# Patient Record
Sex: MALE | Race: WHITE | NOT HISPANIC OR LATINO | Employment: FULL TIME
[De-identification: names, ages, dates, MRNs, and addresses within clinical notes are randomized per-mention and may not be internally consistent; named-entity substitution may affect disease eponyms.]

---

## 2024-03-08 ENCOUNTER — TRANSCRIBE ORDERS (OUTPATIENT)
Dept: OTHER | Age: 39
End: 2024-03-08

## 2024-03-08 DIAGNOSIS — H71.90 CHOLESTEATOMA, UNSPECIFIED LATERALITY: Primary | ICD-10-CM

## 2024-03-11 NOTE — TELEPHONE ENCOUNTER
FUTURE VISIT INFORMATION      FUTURE VISIT INFORMATION:  Date: 6/4/24  Time: 8:40 AM  Location: [facility]  REFERRAL INFORMATION:  Referring provider:  MOY CORONEL MD  Referring providers clinic:  [facility]  Reason for visit/diagnosis:  Cholesteatoma, unspecified laterality [H71.90]  REF BY MOY CORONEL affiliated with [facility]  RECS IN EPIC  CONF LOC  SCHED W/PT     RECORDS REQUESTED FROM      Clinic name Comments Records Status Imaging Status   [facility]   2/6/24 referral/ OV with Moy Coronel MD   CE    [facility]  Fax: [phone] 2/27/24 CT temporal CE Req 4/30/24  PACS           April 30, 2024 7:40 AM - Request for images pushed to [place] from [facility]  May 1, 2024 9:58 AM - Images received and resolved in PACS. Patient is having audiogram done prior to visit with Dr Garcia. Patient was referred to [facility] by Dr Coronel but was told he does not need to -[facility]

## 2024-05-29 DIAGNOSIS — Z01.10 ENCOUNTER FOR HEARING TEST: Primary | ICD-10-CM

## 2024-05-30 NOTE — PROGRESS NOTES
"  Neurotology Clinic      Name: Nikolay Fowler  MRN: [id number]  Age: 38 year old  : 1985  Referring provider: Moy Salomon  2024     Chief Complaint:   Consultation    History of Present Illness:   Nikolay Fowler is a 38 year old male with a history of bilateral tinnitus (R>L) and ear pain, who presents for consultation regarding cholesteatoma.  Consultation was requested by Dr. Moy Salomon.     He initially noticed  high pitched ringing in the right ear over the past several months. It was initially quite bothersome but is now more manageable. His notes that his hearing on the right is muffled as well. He is not having issues with balance. He presented to his PCP and was diagnosed with an ear infection and had two courses of antibiotics. He was then referred to ENT and Dr. Salomon identified a cholesteatoma. He was referred for further management.     He is cleaning his ear with qtips. He had infrequent ear infections as a child. No history of ear surgeries or any other surgeries. He has noticed taste change in the past year.      Review of Systems:   Pertinent items are noted in HPI or as in patient entered ROS below, remainder of complete ROS is negative.       2024    10:11 AM    ENT ROS   Ears, Nose, Throat Hearing loss    Ringing/noise in ears        Active Medications:   Ibuprofen prn for aches or pains     Allergies:   None     Past Medical History:  None     Past Surgical History:  None    Family History:   No family history on file.      Social History:   He works as a  for programming the machines  He smokes cigarettes about 0.5 PPD   No other recreational substances    Physical Exam:   BP (!) 159/88   Pulse 64   Temp 97.5  F (36.4  C)   Ht 1.753 m (5' 9\")   Wt 81.6 kg (180 lb)   SpO2 98%   BMI 26.58 kg/m         Constitutional:  The patient was unaccompanied, well-groomed, and in no acute distress.     Skin: Normal:  warm and pink without rash   Neurologic: " Alert and oriented x 3.  CN's III-XII within normal limits.  Voice normal.    Psychiatric: The patient's affect was calm, cooperative, and appropriate.     Communication:  Normal; communicates verbally, normal voice quality.   Respiratory: Breathing comfortably without stridor or exertion of accessory muscles.    Head/Face:  No lesions or scars. No sinus tenderness.     Eyes: Pupils were equal and reactive.  Extraocular movement intact.     Ears: Pinnae and tragus non-tender.        Otologic microscope exam:  Right ear: EAC is clear. TM with posterior superior white mass under the tympanic membrane. The pars flaccida is covered with wax which was partially removed revealing a retraction pocket.   Left ear: EAC with minimal cerumen which was cleaned with a curette. The TM is intact in neutral position and the middle ear is aerated.      Audiogram:  AUDIOGRAM: He underwent an audiogram today. This demonstrated:      Right: Speech reception threshold is 30 dB with 100% word recognition. Tympanogram A type   Left: Speech reception threshold is 20 dB with 100% word recognition. Tympanogram A type     Audiogram was independently reviewed    Imaging:  CT TEMPORAL W/O CONTRAST (02/27/24)  INDICATION: Cholesteatoma right ear. Hearing loss.     FINDINGS:   Right ear:   External auditory canal is patent. There is a small mass (4 mm) involving the tympanic membrane associated with the long process of the incus and malleus and anterior aspect of the sinus tympani. The epitympanum and Prussak`s space are clear. Mastoid antrum clear. Single opacified right mastoid air cell. The other mastoid air cells are clear. The cochlea, vestibule and semicircular canals are unremarkable.     Left ear:   The external auditory canal normally patent. The middle ear cavity is clear the ossicular chain appears normal the scutum is sharp. The mastoid air cells are well developed and clear. The cochlear vestibule are unremarkable. There is thinning  of the covering of the superior semicircular canal consistent with possible dehiscence.     Wider field-of-view images shows opacification of the right maxillary   sinus which is relatively small/atelectatic.     Impression  Right ear: Small mass involving the right tympanic membrane long process of the malleus and incus with subtle erosion of the scutum consistent with the clinical impression of cholesteatoma. See above.  Left ear: Possible dehiscence of the left superior semicircular canal.  Opacified atelectatic right maxillary sinus.    Outside records from [facility] were independently reviewed.       Assessment and Plan:  Nikolay Fowler is a 38 year old male being seen in consultation regarding a right cholesteatoma.     I was able to show the patient the findings on the monitor today and discussed the pathophysiology of a cholesteatoma. He understands that the growth will continue to erode the bone in the ear and that there is no medical therapy available for this.  The cholesteatoma has likely eroded the ossicles and that is why he is unable to hear well out of this ear.  We discussed the risks of untreated cholesteatoma in detail and he stated that he understands them. He indicated that he is motivated to have a treatment performed on the right ear.     The procedure recommended is a right tympanomastoidectomy. I described the procedure in detail and the implications.  We will plan to reconstruct the tympanic membrane with a cartilage graft. I described that he will require a second stage procedure and that I will not be reconstructing hearing at the time of the first surgery.  Due to the need to remove ossicles involved in disease, we discussed that we expect his hearing to worsen with surgery. This may or may be able to be improved with ossicular reconstruction.    I went through all the steps of the procedure.  I discussed the common and serious risks in detail  including tympanic membrane perforation, taste change, failure to achieve desired hearing improvement, complete and irreversible profound sensorineural hearing loss or variable degrees of sensorineural hearing loss or loss of word understanding, worsening of tinnitus, development of disequilibrium, vertigo or imbalance that could be temporary or permanent, facial nerve paralysis, CSF leak, need for additional procedures, and others as discussed.  Postoperative restrictions were discussed.     He indicated that he would like to proceed. Thank you very much for the opportunity to participate in the care of your patient.     Follow-up: No follow-ups on file.       Dolly Henriquez MD  Fellow Physician  Otology & Neurotology  [facility]      Vidhi Garcia MD  Otology & Neurotology  [facility]    I, Vidhi Garcia MD, saw this patient with the resident/fellow and agree with the resident s findings and plan of care as documented in the resident s/fellow s note. I was present for the entire procedure.

## 2024-05-31 ENCOUNTER — OFFICE VISIT (OUTPATIENT)
Dept: AUDIOLOGY | Facility: CLINIC | Age: 39
End: 2024-05-31
Payer: COMMERCIAL

## 2024-05-31 ENCOUNTER — OFFICE VISIT (OUTPATIENT)
Dept: OTOLARYNGOLOGY | Facility: CLINIC | Age: 39
End: 2024-05-31
Payer: COMMERCIAL

## 2024-05-31 ENCOUNTER — PREP FOR PROCEDURE (OUTPATIENT)
Dept: OTOLARYNGOLOGY | Facility: CLINIC | Age: 39
End: 2024-05-31

## 2024-05-31 VITALS
DIASTOLIC BLOOD PRESSURE: 88 MMHG | HEART RATE: 64 BPM | WEIGHT: 180 LBS | OXYGEN SATURATION: 98 % | TEMPERATURE: 97.5 F | HEIGHT: 69 IN | BODY MASS INDEX: 26.66 KG/M2 | SYSTOLIC BLOOD PRESSURE: 159 MMHG

## 2024-05-31 DIAGNOSIS — H71.91 CHOLESTEATOMA OF RIGHT EAR: Primary | ICD-10-CM

## 2024-05-31 DIAGNOSIS — H71.91 CHOLESTEATOMA, RIGHT: Primary | ICD-10-CM

## 2024-05-31 DIAGNOSIS — H90.11 CONDUCTIVE HEARING LOSS OF RIGHT EAR, UNSPECIFIED HEARING STATUS ON CONTRALATERAL SIDE: ICD-10-CM

## 2024-05-31 DIAGNOSIS — H90.11 CONDUCTIVE HEARING LOSS IN RIGHT EAR: Primary | ICD-10-CM

## 2024-05-31 PROCEDURE — 92557 COMPREHENSIVE HEARING TEST: CPT | Performed by: AUDIOLOGIST

## 2024-05-31 PROCEDURE — 92565 STENGER TEST PURE TONE: CPT | Performed by: AUDIOLOGIST

## 2024-05-31 PROCEDURE — 99244 OFF/OP CNSLTJ NEW/EST MOD 40: CPT | Mod: 25 | Performed by: OTOLARYNGOLOGY

## 2024-05-31 PROCEDURE — 92550 TYMPANOMETRY & REFLEX THRESH: CPT | Performed by: AUDIOLOGIST

## 2024-05-31 PROCEDURE — 92504 EAR MICROSCOPY EXAMINATION: CPT | Mod: GC | Performed by: OTOLARYNGOLOGY

## 2024-05-31 RX ORDER — DEXAMETHASONE SODIUM PHOSPHATE 4 MG/ML
10 INJECTION, SOLUTION INTRA-ARTICULAR; INTRALESIONAL; INTRAMUSCULAR; INTRAVENOUS; SOFT TISSUE ONCE
Status: CANCELLED | OUTPATIENT
Start: 2024-05-31 | End: 2024-05-31

## 2024-05-31 RX ORDER — CEFUROXIME SODIUM 1.5 G/16ML
1.5 INJECTION, POWDER, FOR SOLUTION INTRAVENOUS
Status: CANCELLED | OUTPATIENT
Start: 2024-05-31

## 2024-05-31 RX ORDER — CEFUROXIME SODIUM 1.5 G/16ML
1.5 INJECTION, POWDER, FOR SOLUTION INTRAVENOUS SEE ADMIN INSTRUCTIONS
Status: CANCELLED | OUTPATIENT
Start: 2024-05-31

## 2024-05-31 ASSESSMENT — PAIN SCALES - GENERAL: PAINLEVEL: NO PAIN (0)

## 2024-05-31 NOTE — PATIENT INSTRUCTIONS
You were seen in the ENT Clinic today by Dr. Garcia. If you have any questions or concerns after your appointment, please contact us (see below)      2.   Please return to clinic for your post-op appointments.     Surgery Teaching Surgery Teaching      1.You must have a physical exam (called  history and physical ) within 30 days of surgery. You can do this at the PAC clinic or your family clinic. Bring the Pre-Op Surgery Form (found in the surgery packet that you received in the mail) with you to your primary doctor if you chose to have them complete this. If your doctor is in the [facility], they do not need this form.     2. Ask your doctor what medicines are safe before surgery.          * If you are on any blood-thinners, we will need to make a plan with your INR clinic or prescribing doctor of when you need to stop these medications before surgery    3. NO MOTRIN, IBUPROFEN, ASPIRIN, ALEVE, GARLIC SUPPLEMENTS or FISH OIL x 7 days prior to surgery ( to prevent excess bleeding and bruising at time of surgery).    4. For same-day surgery, you must arrange for an adult to take you home from the Center. An adult must stay with you for the first 24 hours after surgery. You cannot drive for 24 hours, or longer if you are still taking narcotic medications.      5. Stop drinking alcohol at least 24 hours before surgery.      6. Stop or at least cut down on smoking 24 hours before surgery.     7. Take a bath or shower the night before and the morning of surgery (refer to surgery packet for extra information). You should use the soap that we mailed to you or gave in clinic (2 small bottles). Use the entire bottle of soap for each shower, washing from the neck down, then rinsing off. Sleep in clean clothing and use clean bedding sheets. If your doctor does not give you special soap, buy Hibiclens or Kerrie-Stat at the drug store or ask the pharmacist to suggest a brand. Do not put on lotion, powder, perfume,  deodorant or make-up after bathing.     8. You can eat a normal meal the night before surgery. Do not eat any solid foods or drink any milk products for 8 hours before surgery. A pre-op nurse will call you the week before surgery to confirm your eating cut-off time, but please listen to this 8-hour rule if you miss their call.     9. You may drink clear liquids until 2 hours before surgery. Clear liquids include water, Gatorade, apple juice, or other liquids you can read through.    10.  If you need FMLA paperwork filled out for your surgery, please send this to us before surgery if you are able (in-person, fax, or mail). DO NOT give this to the pre-op nurses on the day of surgery or it will get lost.    11. Generally, we recommend 1 week off of work for healing after surgery. If you have a labor-intensive job with heavy lifting, you may need a work-modification and we are able to give you a work letter for this so that you can continue to work.    How to Contact Us:  Send a [employer]) message to your provider. Our team will respond to you via [employer] ([employer]). Occasionally, we will need to call you to get further information.  For urgent matters (Monday-Friday), call the ENT Clinic: [phone] and speak with a call center team member - they will route your call appropriately.   If you'd like to speak directly with a nurse, please find our contact information below. We do our best to check voicemail frequently throughout the day, and will work to call you back within 1-2 days. For urgent matters, please use the general clinic phone numbers listed above.      Karla DICKENS RN  ENT RN Care Coordinator  Direct: [phone]    Anastasia HAMILTON LPN  Direct: [phone]

## 2024-05-31 NOTE — NURSING NOTE
"Chief Complaint   Patient presents with    Consult     Cholesteatoma      Blood pressure (!) 159/88, pulse 64, temperature 97.5  F (36.4  C), height 1.753 m (5' 9\"), weight 81.6 kg (180 lb), SpO2 98%.  Christian Camargo LPN    "

## 2024-05-31 NOTE — PROGRESS NOTES
AUDIOLOGY REPORT    SUMMARY: Audiology visit completed. See audiogram for results.      RECOMMENDATIONS: Follow-up with ENT.    oJse Campos, [facility]  Licensed Audiologist  [place] License #[id number]

## 2024-05-31 NOTE — LETTER
2024       RE: Nikolay Fowler  [address] [address]     Dear Colleague,    Thank you for referring your patient, Nikolay Fowler, to the [facility] [place] [facility]. Please see a copy of my visit note below.      Neurotology Clinic      Name: Nikolay Fowler  MRN: [id number]  Age: 38 year old  : 1985  Referring provider: Moy Salomon  2024     Chief Complaint:   Consultation    History of Present Illness:   Nikolay Fowler is a 38 year old male with a history of bilateral tinnitus (R>L) and ear pain, who presents for consultation regarding cholesteatoma.  Consultation was requested by Dr. Moy Salomon.     He initially noticed  high pitched ringing in the right ear over the past several months. It was initially quite bothersome but is now more manageable. His notes that his hearing on the right is muffled as well. He is not having issues with balance. He presented to his PCP and was diagnosed with an ear infection and had two courses of antibiotics. He was then referred to ENT and Dr. Salomon identified a cholesteatoma. He was referred for further management.     He is cleaning his ear with qtips. He had infrequent ear infections as a child. No history of ear surgeries or any other surgeries. He has noticed taste change in the past year.      Review of Systems:   Pertinent items are noted in HPI or as in patient entered ROS below, remainder of complete ROS is negative.       2024    10:11 AM    ENT ROS   Ears, Nose, Throat Hearing loss    Ringing/noise in ears        Active Medications:   Ibuprofen prn for aches or pains     Allergies:   None     Past Medical History:  None     Past Surgical History:  None    Family History:   No family history on file.      Social History:   He works as a  for programming the machines  He smokes cigarettes about 0.5 PPD   No other  "recreational substances    Physical Exam:   BP (!) 159/88   Pulse 64   Temp 97.5  F (36.4  C)   Ht 1.753 m (5' 9\")   Wt 81.6 kg (180 lb)   SpO2 98%   BMI 26.58 kg/m         Constitutional:  The patient was unaccompanied, well-groomed, and in no acute distress.     Skin: Normal:  warm and pink without rash   Neurologic: Alert and oriented x 3.  CN's III-XII within normal limits.  Voice normal.    Psychiatric: The patient's affect was calm, cooperative, and appropriate.     Communication:  Normal; communicates verbally, normal voice quality.   Respiratory: Breathing comfortably without stridor or exertion of accessory muscles.    Head/Face:  No lesions or scars. No sinus tenderness.     Eyes: Pupils were equal and reactive.  Extraocular movement intact.     Ears: Pinnae and tragus non-tender.        Otologic microscope exam:  Right ear: EAC is clear. TM with posterior superior white mass under the tympanic membrane. The pars flaccida is covered with wax which was partially removed revealing a retraction pocket.   Left ear: EAC with minimal cerumen which was cleaned with a curette. The TM is intact in neutral position and the middle ear is aerated.      Audiogram:  AUDIOGRAM: He underwent an audiogram today. This demonstrated:      Right: Speech reception threshold is 30 dB with 100% word recognition. Tympanogram A type   Left: Speech reception threshold is 20 dB with 100% word recognition. Tympanogram A type     Audiogram was independently reviewed    Imaging:  CT TEMPORAL W/O CONTRAST (02/27/24)  INDICATION: Cholesteatoma right ear. Hearing loss.     FINDINGS:   Right ear:   External auditory canal is patent. There is a small mass (4 mm) involving the tympanic membrane associated with the long process of the incus and malleus and anterior aspect of the sinus tympani. The epitympanum and Prussak`s space are clear. Mastoid antrum clear. Single opacified right mastoid air cell. The other mastoid air cells are " clear. The cochlea, vestibule and semicircular canals are unremarkable.     Left ear:   The external auditory canal normally patent. The middle ear cavity is clear the ossicular chain appears normal the scutum is sharp. The mastoid air cells are well developed and clear. The cochlear vestibule are unremarkable. There is thinning of the covering of the superior semicircular canal consistent with possible dehiscence.     Wider field-of-view images shows opacification of the right maxillary   sinus which is relatively small/atelectatic.     Impression  Right ear: Small mass involving the right tympanic membrane long process of the malleus and incus with subtle erosion of the scutum consistent with the clinical impression of cholesteatoma. See above.  Left ear: Possible dehiscence of the left superior semicircular canal.  Opacified atelectatic right maxillary sinus.    Outside records from [facility] were independently reviewed.       Assessment and Plan:  Nikolay Fowler is a 38 year old male being seen in consultation regarding a right cholesteatoma.     I was able to show the patient the findings on the monitor today and discussed the pathophysiology of a cholesteatoma. He understands that the growth will continue to erode the bone in the ear and that there is no medical therapy available for this.  The cholesteatoma has likely eroded the ossicles and that is why he is unable to hear well out of this ear.  We discussed the risks of untreated cholesteatoma in detail and he stated that he understands them. He indicated that he is motivated to have a treatment performed on the right ear.     The procedure recommended is a right tympanomastoidectomy. I described the procedure in detail and the implications.  We will plan to reconstruct the tympanic membrane with a cartilage graft. I described that he will require a second stage procedure and that I will not be reconstructing hearing  at the time of the first surgery.  Due to the need to remove ossicles involved in disease, we discussed that we expect his hearing to worsen with surgery. This may or may be able to be improved with ossicular reconstruction.    I went through all the steps of the procedure.  I discussed the common and serious risks in detail including tympanic membrane perforation, taste change, failure to achieve desired hearing improvement, complete and irreversible profound sensorineural hearing loss or variable degrees of sensorineural hearing loss or loss of word understanding, worsening of tinnitus, development of disequilibrium, vertigo or imbalance that could be temporary or permanent, facial nerve paralysis, CSF leak, need for additional procedures, and others as discussed.  Postoperative restrictions were discussed.     He indicated that he would like to proceed. Thank you very much for the opportunity to participate in the care of your patient.     Follow-up: No follow-ups on file.       Dolly Henriquez MD  Fellow Physician  Otology & Neurotology  [facility]      Vidhi Garcia MD  Otology & Neurotology  [facility]    I, Vidhi Garcia MD, saw this patient with the resident/fellow and agree with the resident s findings and plan of care as documented in the resident s/fellow s note. I was present for the entire procedure.      Again, thank you for allowing me to participate in the care of your patient.      Sincerely,    Vidhi Garcia MD

## 2024-06-03 ENCOUNTER — TELEPHONE (OUTPATIENT)
Dept: OTOLARYNGOLOGY | Facility: CLINIC | Age: 39
End: 2024-06-03
Payer: COMMERCIAL

## 2024-06-03 NOTE — TELEPHONE ENCOUNTER
Called patient to schedule surgery with Dr. Garcia. Voicemail left for patient, mid voicemail patient picked up but writer could not hear back. Unable to reach patient     Rhiannon Cuba on 6/3/2024 at 3:26 PM  Phone: [phone]

## 2024-06-04 ENCOUNTER — PRE VISIT (OUTPATIENT)
Dept: OTOLARYNGOLOGY | Facility: CLINIC | Age: 39
End: 2024-06-04

## 2024-06-04 PROBLEM — H71.91 CHOLESTEATOMA, RIGHT: Status: ACTIVE | Noted: 2024-05-31

## 2024-06-04 NOTE — TELEPHONE ENCOUNTER
Scheduled surgery with Dr. Garcia on 9/4/2024 - patient declined [birth year] as his [birth year] is a few days later    Spoke with: Patient    Surgery is located at [facility]    Patient does not have a PCP but is planning on going to [facility] in [facility] for their H&P within 30 days of surgery    Does patient need a consult before upcoming surgery? No: already done    Anesthesia type: General    Requested Imaging required for surgery: No    Patient is scheduled for their 3 week post op on 9/24/2024 at 1050am with Dr. Garcia    Patient will receive their surgery packet & surgical soap via mail per their preference - Confirmed address on file is up to date    Patient was not provided a start time for surgery & is aware they will receive this information 3-5 days before surgery    Additional comments: Patient was instructed to call back with any further questions or concerns.     Joseline Naranjo on 6/4/2024 at 8:14 AM

## 2024-09-03 ENCOUNTER — ANESTHESIA EVENT (OUTPATIENT)
Dept: SURGERY | Facility: AMBULATORY SURGERY CENTER | Age: 39
End: 2024-09-03
Payer: COMMERCIAL

## 2024-09-03 NOTE — ANESTHESIA PREPROCEDURE EVALUATION
"Anesthesia Pre-Procedure Evaluation    Patient: Nikolay Fowler   MRN: [id number] : 1985        Procedure : Procedure(s):  RIGHT CANAL WALL UP TYMPANOMASTIODECTOMY WITH CARTILAGE GRAFT          No past medical history on file.   No past surgical history on file.   No Known Allergies   Social History     Tobacco Use     Smoking status: Every Day     Current packs/day: 0.50     Types: Cigarettes     Smokeless tobacco: Never     Tobacco comments:     Has smoked cigarettes for 22 years    Substance Use Topics     Alcohol use: Not on file      Wt Readings from Last 1 Encounters:   24 81.6 kg (180 lb)        Anesthesia Evaluation   Pt has not had prior anesthetic         ROS/MED HX  ENT/Pulmonary: Comment: Right ear colesteatoma    (+)     DESTINEY risk factors, snores loudly,          tobacco use (0.5), Current use,   patient smoked within 24 hours,                    Neurologic:  - neg neurologic ROS     Cardiovascular:  - neg cardiovascular ROS     METS/Exercise Tolerance: 3 - Able to walk 1-2 blocks without stopping    Hematologic:  - neg hematologic  ROS     Musculoskeletal:  - neg musculoskeletal ROS     GI/Hepatic:  - neg GI/hepatic ROS     Renal/Genitourinary:  - neg Renal ROS     Endo:  - neg endo ROS     Psychiatric/Substance Use:  - neg psychiatric ROS     Infectious Disease:  - neg infectious disease ROS     Malignancy:  - neg malignancy ROS     Other:          Physical Exam    Airway        Mallampati: III   TM distance: > 3 FB   Neck ROM: full   Mouth opening: > 3 cm    Respiratory Devices and Support         Dental       (+) Multiple visibly decayed, broken teeth      Cardiovascular   cardiovascular exam normal       Rhythm and rate: regular and normal     Pulmonary           breath sounds clear to auscultation       OUTSIDE LABS:  CBC: No results found for: \"WBC\", \"HGB\", \"HCT\", \"PLT\"  BMP: No results found for: \"NA\", \"POTASSIUM\", \"CHLORIDE\", \"CO2\", \"BUN\", \"CR\", \"GLC\"  COAGS: No results found " "for: \"PTT\", \"INR\", \"FIBR\"  POC: No results found for: \"BGM\", \"HCG\", \"HCGS\"  HEPATIC: No results found for: \"ALBUMIN\", \"PROTTOTAL\", \"ALT\", \"AST\", \"GGT\", \"ALKPHOS\", \"BILITOTAL\", \"BILIDIRECT\", \"RAYNE\"  OTHER: No results found for: \"PH\", \"LACT\", \"A1C\", \"PEYTON\", \"PHOS\", \"MAG\", \"LIPASE\", \"AMYLASE\", \"TSH\", \"T4\", \"T3\", \"CRP\", \"SED\"    Anesthesia Plan    ASA Status:  2    NPO Status:  NPO Appropriate    Anesthesia Type: General.     - Airway: ETT   Induction: Intravenous, Propofol.   Maintenance: TIVA.   Techniques and Equipment:     - Airway: Video-Laryngoscope       Consents    Anesthesia Plan(s) and associated risks, benefits, and realistic alternatives discussed. Questions answered and patient/representative(s) expressed understanding.     - Discussed:     - Discussed with:  Patient            Postoperative Care    Pain management: Multi-modal analgesia.   PONV prophylaxis: Ondansetron (or other 5HT-3), Dexamethasone or Solumedrol, Background Propofol Infusion     Comments:    Other Comments: Duoneb pre-op         Elvia Wright MD    I have reviewed the pertinent notes and labs in the chart from the past 30 days and (re)examined the patient.  Any updates or changes from those notes are reflected in this note.                  "

## 2024-09-04 ENCOUNTER — HOSPITAL ENCOUNTER (OUTPATIENT)
Facility: AMBULATORY SURGERY CENTER | Age: 39
Discharge: HOME | End: 2024-09-04
Attending: OTOLARYNGOLOGY | Admitting: OTOLARYNGOLOGY
Payer: COMMERCIAL

## 2024-09-04 ENCOUNTER — ANESTHESIA (OUTPATIENT)
Dept: SURGERY | Facility: AMBULATORY SURGERY CENTER | Age: 39
End: 2024-09-04
Payer: COMMERCIAL

## 2024-09-04 VITALS
BODY MASS INDEX: 26.66 KG/M2 | HEIGHT: 69 IN | WEIGHT: 180 LBS | TEMPERATURE: 98 F | DIASTOLIC BLOOD PRESSURE: 96 MMHG | HEART RATE: 94 BPM | SYSTOLIC BLOOD PRESSURE: 143 MMHG | RESPIRATION RATE: 20 BRPM | OXYGEN SATURATION: 94 %

## 2024-09-04 DIAGNOSIS — H71.91 CHOLESTEATOMA, RIGHT: Primary | ICD-10-CM

## 2024-09-04 PROCEDURE — 21235 EAR CARTILAGE GRAFT: CPT | Mod: 59 | Performed by: OTOLARYNGOLOGY

## 2024-09-04 PROCEDURE — 69631 REPAIR EARDRUM STRUCTURES: CPT | Mod: RT | Performed by: OTOLARYNGOLOGY

## 2024-09-04 PROCEDURE — 69631 REPAIR EARDRUM STRUCTURES: CPT | Mod: RT

## 2024-09-04 PROCEDURE — 69631 REPAIR EARDRUM STRUCTURES: CPT | Performed by: ANESTHESIOLOGY

## 2024-09-04 PROCEDURE — 21235 EAR CARTILAGE GRAFT: CPT | Mod: 59

## 2024-09-04 PROCEDURE — 88304 TISSUE EXAM BY PATHOLOGIST: CPT | Mod: TC | Performed by: OTOLARYNGOLOGY

## 2024-09-04 PROCEDURE — 88304 TISSUE EXAM BY PATHOLOGIST: CPT | Mod: 26 | Performed by: STUDENT IN AN ORGANIZED HEALTH CARE EDUCATION/TRAINING PROGRAM

## 2024-09-04 PROCEDURE — 69631 REPAIR EARDRUM STRUCTURES: CPT | Performed by: NURSE ANESTHETIST, CERTIFIED REGISTERED

## 2024-09-04 RX ORDER — ONDANSETRON 4 MG/1
4 TABLET, ORALLY DISINTEGRATING ORAL EVERY 30 MIN PRN
Status: DISCONTINUED | OUTPATIENT
Start: 2024-09-04 | End: 2024-09-05 | Stop reason: HOSPADM

## 2024-09-04 RX ORDER — VASOPRESSIN IN 0.9 % NACL 2 UNIT/2ML
SYRINGE (ML) INTRAVENOUS PRN
Status: DISCONTINUED | OUTPATIENT
Start: 2024-09-04 | End: 2024-09-04

## 2024-09-04 RX ORDER — DEXAMETHASONE SODIUM PHOSPHATE 10 MG/ML
4 INJECTION, SOLUTION INTRAMUSCULAR; INTRAVENOUS
Status: DISCONTINUED | OUTPATIENT
Start: 2024-09-04 | End: 2024-09-05 | Stop reason: HOSPADM

## 2024-09-04 RX ORDER — LIDOCAINE 40 MG/G
CREAM TOPICAL
Status: DISCONTINUED | OUTPATIENT
Start: 2024-09-04 | End: 2024-09-05 | Stop reason: HOSPADM

## 2024-09-04 RX ORDER — PROPOFOL 10 MG/ML
INJECTION, EMULSION INTRAVENOUS PRN
Status: DISCONTINUED | OUTPATIENT
Start: 2024-09-04 | End: 2024-09-04

## 2024-09-04 RX ORDER — MAGNESIUM HYDROXIDE 1200 MG/15ML
LIQUID ORAL PRN
Status: DISCONTINUED | OUTPATIENT
Start: 2024-09-04 | End: 2024-09-04 | Stop reason: HOSPADM

## 2024-09-04 RX ORDER — KETOROLAC TROMETHAMINE 30 MG/ML
INJECTION, SOLUTION INTRAMUSCULAR; INTRAVENOUS PRN
Status: DISCONTINUED | OUTPATIENT
Start: 2024-09-04 | End: 2024-09-04

## 2024-09-04 RX ORDER — FENTANYL CITRATE 50 UG/ML
INJECTION, SOLUTION INTRAMUSCULAR; INTRAVENOUS PRN
Status: DISCONTINUED | OUTPATIENT
Start: 2024-09-04 | End: 2024-09-04

## 2024-09-04 RX ORDER — HYDROMORPHONE HYDROCHLORIDE 1 MG/ML
0.2 INJECTION, SOLUTION INTRAMUSCULAR; INTRAVENOUS; SUBCUTANEOUS EVERY 5 MIN PRN
Status: DISCONTINUED | OUTPATIENT
Start: 2024-09-04 | End: 2024-09-05 | Stop reason: HOSPADM

## 2024-09-04 RX ORDER — SODIUM CHLORIDE, SODIUM LACTATE, POTASSIUM CHLORIDE, CALCIUM CHLORIDE 600; 310; 30; 20 MG/100ML; MG/100ML; MG/100ML; MG/100ML
INJECTION, SOLUTION INTRAVENOUS CONTINUOUS
Status: DISCONTINUED | OUTPATIENT
Start: 2024-09-04 | End: 2024-09-05 | Stop reason: HOSPADM

## 2024-09-04 RX ORDER — IPRATROPIUM BROMIDE AND ALBUTEROL SULFATE 2.5; .5 MG/3ML; MG/3ML
3 SOLUTION RESPIRATORY (INHALATION) ONCE
Status: COMPLETED | OUTPATIENT
Start: 2024-09-04 | End: 2024-09-04

## 2024-09-04 RX ORDER — NALOXONE HYDROCHLORIDE 0.4 MG/ML
0.1 INJECTION, SOLUTION INTRAMUSCULAR; INTRAVENOUS; SUBCUTANEOUS
Status: DISCONTINUED | OUTPATIENT
Start: 2024-09-04 | End: 2024-09-05 | Stop reason: HOSPADM

## 2024-09-04 RX ORDER — ONDANSETRON 2 MG/ML
4 INJECTION INTRAMUSCULAR; INTRAVENOUS EVERY 30 MIN PRN
Status: DISCONTINUED | OUTPATIENT
Start: 2024-09-04 | End: 2024-09-05 | Stop reason: HOSPADM

## 2024-09-04 RX ORDER — LIDOCAINE HYDROCHLORIDE AND EPINEPHRINE 10; 10 MG/ML; UG/ML
INJECTION, SOLUTION INFILTRATION; PERINEURAL PRN
Status: DISCONTINUED | OUTPATIENT
Start: 2024-09-04 | End: 2024-09-04 | Stop reason: HOSPADM

## 2024-09-04 RX ORDER — OXYCODONE HYDROCHLORIDE 5 MG/1
10 TABLET ORAL
Status: DISCONTINUED | OUTPATIENT
Start: 2024-09-04 | End: 2024-09-05 | Stop reason: HOSPADM

## 2024-09-04 RX ORDER — OXYCODONE HYDROCHLORIDE 5 MG/1
5 TABLET ORAL
Status: DISCONTINUED | OUTPATIENT
Start: 2024-09-04 | End: 2024-09-05 | Stop reason: HOSPADM

## 2024-09-04 RX ORDER — HYDROCODONE BITARTRATE AND ACETAMINOPHEN 5; 325 MG/1; MG/1
1 TABLET ORAL EVERY 6 HOURS PRN
Qty: 18 TABLET | Refills: 0 | Status: SHIPPED | OUTPATIENT
Start: 2024-09-04 | End: 2024-09-11

## 2024-09-04 RX ORDER — CEFUROXIME SODIUM 1.5 G/16ML
1.5 INJECTION, POWDER, FOR SOLUTION INTRAVENOUS SEE ADMIN INSTRUCTIONS
Status: DISCONTINUED | OUTPATIENT
Start: 2024-09-04 | End: 2024-09-05 | Stop reason: HOSPADM

## 2024-09-04 RX ORDER — CEFUROXIME SODIUM 1.5 G/16ML
1.5 INJECTION, POWDER, FOR SOLUTION INTRAVENOUS
Status: COMPLETED | OUTPATIENT
Start: 2024-09-04 | End: 2024-09-04

## 2024-09-04 RX ORDER — FENTANYL CITRATE 50 UG/ML
25 INJECTION, SOLUTION INTRAMUSCULAR; INTRAVENOUS EVERY 5 MIN PRN
Status: DISCONTINUED | OUTPATIENT
Start: 2024-09-04 | End: 2024-09-05 | Stop reason: HOSPADM

## 2024-09-04 RX ORDER — CIPROFLOXACIN AND DEXAMETHASONE 3; 1 MG/ML; MG/ML
SUSPENSION/ DROPS AURICULAR (OTIC) PRN
Status: DISCONTINUED | OUTPATIENT
Start: 2024-09-04 | End: 2024-09-04 | Stop reason: HOSPADM

## 2024-09-04 RX ORDER — EPHEDRINE SULFATE 50 MG/ML
INJECTION, SOLUTION INTRAMUSCULAR; INTRAVENOUS; SUBCUTANEOUS PRN
Status: DISCONTINUED | OUTPATIENT
Start: 2024-09-04 | End: 2024-09-04

## 2024-09-04 RX ORDER — DEXAMETHASONE SODIUM PHOSPHATE 10 MG/ML
10 INJECTION, SOLUTION INTRAMUSCULAR; INTRAVENOUS ONCE
Status: COMPLETED | OUTPATIENT
Start: 2024-09-04 | End: 2024-09-04

## 2024-09-04 RX ORDER — HYDROMORPHONE HYDROCHLORIDE 1 MG/ML
0.4 INJECTION, SOLUTION INTRAMUSCULAR; INTRAVENOUS; SUBCUTANEOUS EVERY 5 MIN PRN
Status: DISCONTINUED | OUTPATIENT
Start: 2024-09-04 | End: 2024-09-05 | Stop reason: HOSPADM

## 2024-09-04 RX ORDER — LIDOCAINE HYDROCHLORIDE 20 MG/ML
INJECTION, SOLUTION INFILTRATION; PERINEURAL PRN
Status: DISCONTINUED | OUTPATIENT
Start: 2024-09-04 | End: 2024-09-04

## 2024-09-04 RX ORDER — ACETAMINOPHEN 325 MG/1
975 TABLET ORAL ONCE
Status: COMPLETED | OUTPATIENT
Start: 2024-09-04 | End: 2024-09-04

## 2024-09-04 RX ORDER — PROPOFOL 10 MG/ML
INJECTION, EMULSION INTRAVENOUS CONTINUOUS PRN
Status: DISCONTINUED | OUTPATIENT
Start: 2024-09-04 | End: 2024-09-04

## 2024-09-04 RX ORDER — CEFUROXIME AXETIL 250 MG/1
250 TABLET ORAL 2 TIMES DAILY
Qty: 14 TABLET | Refills: 0 | Status: SHIPPED | OUTPATIENT
Start: 2024-09-04 | End: 2024-09-11

## 2024-09-04 RX ORDER — FENTANYL CITRATE 50 UG/ML
50 INJECTION, SOLUTION INTRAMUSCULAR; INTRAVENOUS EVERY 5 MIN PRN
Status: DISCONTINUED | OUTPATIENT
Start: 2024-09-04 | End: 2024-09-05 | Stop reason: HOSPADM

## 2024-09-04 RX ORDER — LABETALOL HYDROCHLORIDE 5 MG/ML
10 INJECTION, SOLUTION INTRAVENOUS
Status: DISCONTINUED | OUTPATIENT
Start: 2024-09-04 | End: 2024-09-05 | Stop reason: HOSPADM

## 2024-09-04 RX ADMIN — PROPOFOL 150 MCG/KG/MIN: 10 INJECTION, EMULSION INTRAVENOUS at 07:23

## 2024-09-04 RX ADMIN — FENTANYL CITRATE 100 MCG: 50 INJECTION, SOLUTION INTRAMUSCULAR; INTRAVENOUS at 07:23

## 2024-09-04 RX ADMIN — EPHEDRINE SULFATE 5 MG: 50 INJECTION, SOLUTION INTRAMUSCULAR; INTRAVENOUS; SUBCUTANEOUS at 08:16

## 2024-09-04 RX ADMIN — KETOROLAC TROMETHAMINE 30 MG: 30 INJECTION, SOLUTION INTRAMUSCULAR; INTRAVENOUS at 11:12

## 2024-09-04 RX ADMIN — SODIUM CHLORIDE, SODIUM LACTATE, POTASSIUM CHLORIDE, CALCIUM CHLORIDE: 600; 310; 30; 20 INJECTION, SOLUTION INTRAVENOUS at 10:10

## 2024-09-04 RX ADMIN — EPHEDRINE SULFATE 5 MG: 50 INJECTION, SOLUTION INTRAMUSCULAR; INTRAVENOUS; SUBCUTANEOUS at 08:09

## 2024-09-04 RX ADMIN — Medication 2 UNITS: at 08:22

## 2024-09-04 RX ADMIN — PROPOFOL 200 MG: 10 INJECTION, EMULSION INTRAVENOUS at 07:23

## 2024-09-04 RX ADMIN — SODIUM CHLORIDE, SODIUM LACTATE, POTASSIUM CHLORIDE, CALCIUM CHLORIDE: 600; 310; 30; 20 INJECTION, SOLUTION INTRAVENOUS at 06:38

## 2024-09-04 RX ADMIN — DEXAMETHASONE SODIUM PHOSPHATE 10 MG: 10 INJECTION, SOLUTION INTRAMUSCULAR; INTRAVENOUS at 07:24

## 2024-09-04 RX ADMIN — Medication 1 UNITS: at 08:36

## 2024-09-04 RX ADMIN — PROPOFOL 120 MCG/KG/MIN: 10 INJECTION, EMULSION INTRAVENOUS at 09:01

## 2024-09-04 RX ADMIN — PROPOFOL 100 MCG/KG/MIN: 10 INJECTION, EMULSION INTRAVENOUS at 09:59

## 2024-09-04 RX ADMIN — Medication 0.4 MCG/KG/MIN: at 09:01

## 2024-09-04 RX ADMIN — Medication 200 MCG: at 07:40

## 2024-09-04 RX ADMIN — ACETAMINOPHEN 975 MG: 325 TABLET ORAL at 06:37

## 2024-09-04 RX ADMIN — EPHEDRINE SULFATE 10 MG: 50 INJECTION, SOLUTION INTRAMUSCULAR; INTRAVENOUS; SUBCUTANEOUS at 07:57

## 2024-09-04 RX ADMIN — EPHEDRINE SULFATE 5 MG: 50 INJECTION, SOLUTION INTRAMUSCULAR; INTRAVENOUS; SUBCUTANEOUS at 08:03

## 2024-09-04 RX ADMIN — Medication 0.5 MG: at 11:12

## 2024-09-04 RX ADMIN — CEFUROXIME SODIUM 1.5 G: 1.5 INJECTION, POWDER, FOR SOLUTION INTRAVENOUS at 07:32

## 2024-09-04 RX ADMIN — Medication 10 MG: at 07:24

## 2024-09-04 RX ADMIN — Medication 100 MCG: at 08:09

## 2024-09-04 RX ADMIN — LIDOCAINE HYDROCHLORIDE 100 MG: 20 INJECTION, SOLUTION INFILTRATION; PERINEURAL at 07:23

## 2024-09-04 RX ADMIN — Medication 150 MCG: at 07:47

## 2024-09-04 RX ADMIN — PROPOFOL 100 MCG/KG/MIN: 10 INJECTION, EMULSION INTRAVENOUS at 10:55

## 2024-09-04 RX ADMIN — Medication 0.4 MCG/KG/MIN: at 09:34

## 2024-09-04 RX ADMIN — Medication 200 MCG: at 07:43

## 2024-09-04 RX ADMIN — Medication 1 UNITS: at 08:49

## 2024-09-04 RX ADMIN — Medication 100 MCG: at 08:16

## 2024-09-04 RX ADMIN — Medication 0.2 MCG/KG/MIN: at 08:22

## 2024-09-04 RX ADMIN — Medication 150 MCG: at 07:57

## 2024-09-04 RX ADMIN — Medication 100 MCG: at 07:37

## 2024-09-04 RX ADMIN — Medication 0.2 MCG/KG/MIN: at 10:09

## 2024-09-04 RX ADMIN — PROPOFOL 120 MCG/KG/MIN: 10 INJECTION, EMULSION INTRAVENOUS at 08:08

## 2024-09-04 RX ADMIN — IPRATROPIUM BROMIDE AND ALBUTEROL SULFATE 3 ML: 2.5; .5 SOLUTION RESPIRATORY (INHALATION) at 07:03

## 2024-09-04 ASSESSMENT — LIFESTYLE VARIABLES: TOBACCO_USE: 1

## 2024-09-04 NOTE — LETTER
2024    Nikolay Fowler   1985        To Whom it May Concern;    Please excuse Nikolay Fowler from work for a healthcare visit on 2024. He may return to work starting . He has weight lifting restrictions as he recovers from his procedure and may not lift anything greater than 10 lbs until 24 at the earliest.     Sincerely,          Dr. Vidhi Garcia MD, MS

## 2024-09-04 NOTE — DISCHARGE INSTRUCTIONS
ENT Post-Operative Care Instructions    You are going home with a few medications: pain medications and antibiotics  Keep the earmuff (manisha) dressing in place for 48 hours. After 48 hours, you can take off the earmuff dressing and the cotton ball sitting outside of the ear canal. Do not remove packing inside of your ear canal.   Please refrain from blowing your nose, using a straw, straining (take stool softeners), and lifting greater than 10 pounds. Try to open your mouth when you sneeze.    Refrain from showering until 48 hours after surgery. After that time you can use a cup to cover the ear while you shower and cotton ball with Vaseline on the outside of the ear to keep water out (both canal and post-auricular incision must remain dry).   Some drainage from your ear is normal, it can be yellow or red-tinged drainage. If you have dark red blood draining from your ear please call our clinic   If you are given ear drops after the surgery, start applying ear drops 3 days prior to the scheduled visit. Apply drops over the ear canal packing 4 drops twice a day.   Follow-up in clinic as scheduled in 3 weeks     [facility]  Home Care Following Anesthesia  For 24 hours after surgery:  Get plenty of rest.  A responsible adult must stay with you for at least 24 hours after you leave the surgery center.  Do not drive or use heavy equipment.  If you have weakness or tingling, don't drive or use heavy equipment until this feeling goes away.   Do not drink alcohol.   Avoid strenuous or risky activities.  Ask for help when climbing stairs.  You may feel lightheaded.  IF so, sit for a few minutes before standing.  Have someone help you get up.   If you have nausea (feel sick to your stomach): Drink only clear liquids such as apple juice, ginger ale, broth or 7-Up.  Rest may also help.  Be sure to drink enough fluids.  Move to a regular diet as you feel able.   You may have a slight  fever.  Call the doctor if your fever is over 100 F (37.7 C) (taken under the tongue) or lasts longer than 24 hours.  You may have a dry mouth, a sore throat, muscle aches or trouble sleeping. These should go away after 24 hours.  Do not make important or legal decisions.   It is recommended to avoid smoking.               Tips for taking pain medications  To get the best pain relief possible, remember these points:  Take pain medications as directed, before pain becomes severe.  Pain medication can upset your stomach: taking it with food may help.  Constipation is a common side effect of pain medication. Drink plenty of  fluids.  Eat foods high in fiber. Take a stool softener if recommended by your doctor or pharmacist.  Do not drink alcohol, drive or operate machinery while taking pain medications.  Ask about other ways to control pain, such as with heat, ice or relaxation.    Tylenol/Acetaminophen Consumption    If you feel your pain relief is insufficient, you may take Tylenol/Acetaminophen in addition to your narcotic pain medication.   Be careful not to exceed 4,000 mg of Tylenol/Acetaminophen in a 24 hour period from all sources.  If you are taking extra strength Tylenol/acetaminophen (500 mg), the maximum dose is 8 tablets in 24 hours.  If you are taking regular strength acetaminophen (325 mg), the maximum dose is 12 tablets in 24 hours.    Call a doctor for any of the following:  Signs of infection (fever, growing tenderness at the surgery site, a large amount of drainage or bleeding, severe pain, foul-smelling drainage, redness, swelling).  It has been over 8 to 10 hours since surgery and you are still not able to urinate (pass water).  Headache for over 24 hours.  Numbness, tingling or weakness the day after surgery (if you had spinal anesthesia).  Signs of Covid-19 infection (temperature over 100 degrees, shortness of breath, cough, loss of taste/smell, generalized body aches, persistent headache,  chills, sore throat, nausea/vomiting/diarrhea)  Your doctor is:  Dr. Vidhi Garcia, ENT Otolaryngology: [phone]                    Or dial [phone] and ask for the resident on call for:  ENT Otolaryngology  For emergency care, call the:  [facility]:  [phone] (TTY for hearing impaired: [phone])

## 2024-09-04 NOTE — ANESTHESIA POSTPROCEDURE EVALUATION
Patient: Nikolay Fowler    Procedure: Procedure(s):  RIGHT ATTICOTOMY AND TYMPANOPLASTY WITH CARTILAGE GRAFT, Removal of cholesteatoma       Anesthesia Type:  General    Note:  Disposition: Outpatient   Postop Pain Control: Uneventful            Sign Out: Well controlled pain   PONV: No   Neuro/Psych: Uneventful            Sign Out: Acceptable/Baseline neuro status   Airway/Respiratory: Uneventful            Sign Out: Acceptable/Baseline resp. status   CV/Hemodynamics: Uneventful            Sign Out: Acceptable CV status; No obvious hypovolemia; No obvious fluid overload   Other NRE: NONE   DID A NON-ROUTINE EVENT OCCUR? No       Last vitals:  Vitals Value Taken Time   /96 09/04/24 1215   Temp 36.6  C (97.9  F) 09/04/24 1215   Pulse 91 09/04/24 1215   Resp 20 09/04/24 1215   SpO2 95 % 09/04/24 1215   Vitals shown include unfiled device data.    Electronically Signed By: Elvia Wright MD  September 4, 2024  1:38 PM

## 2024-09-04 NOTE — ANESTHESIA CARE TRANSFER NOTE
Patient: Nikolay Fowler    Procedure: Procedure(s):  RIGHT ATTICOTOMY AND TYMPANOPLASTY WITH CARTILAGE GRAFT, Removal of cholesteatoma       Diagnosis: Cholesteatoma, right [H71.91]  Diagnosis Additional Information: No value filed.    Anesthesia Type:   General     Note:    Oropharynx: oropharynx clear of all foreign objects and spontaneously breathing  Level of Consciousness: awake  Oxygen Supplementation: face mask  Level of Supplemental Oxygen (L/min / FiO2): 6  Independent Airway: airway patency satisfactory and stable  Dentition: dentition unchanged  Vital Signs Stable: post-procedure vital signs reviewed and stable  Report to RN Given: handoff report given  Patient transferred to: PACU  Comments: VSS and WNL, comfortable, no PONV, report to Dionne CHASE  Handoff Report: Identifed the Patient, Identified the Reponsible Provider, Reviewed the pertinent medical history, Discussed the surgical course, Reviewed Intra-OP anesthesia mangement and issues during anesthesia, Set expectations for post-procedure period and Allowed opportunity for questions and acknowledgement of understanding      Vitals:  Vitals Value Taken Time   /96 09/04/24 1144   Temp     Pulse 109 09/04/24 1148   Resp 18 09/04/24 1148   SpO2 95 % 09/04/24 1148   Vitals shown include unfiled device data.    Electronically Signed By: RAMIREZ Jackson CRNA  September 4, 2024  11:49 AM

## 2024-09-04 NOTE — OP NOTE
Date of service:  9/4/2024     Preoperative diagnosis:  right tympanic membrane perforation and cholesteatoma      Postoperative diagnosis:  right tympanic membrane perforation and cholesteatoma     Procedures:  Right postauricular tympanoplasty with atticotomy and canalplasty   [place] of tragal cartilage graft  Facial nerve monitoring     Surgeon:  Vidhi Garcia MD     Fellow:  Dloly Henriquez MD     Resident:  Jeanette Saavedra MD     Anesthesia:  General endotracheal     EBL:  15 cc     Specimens:  Right middle ear contents     Drains:  none     Complications:  None     Findings: Right epitympanic retraction pocket with well defined cholesteatoma involving the posterior mesotympanum. The ossicular chain was intact and mobile though the long process of the incus was eroded and the incudostapedial joint was solely comprised of a fibrous union. The retraction pocket was excised and the remaining native TM was noted to be very atrophic. Reconstruction was performed with temporalis fascia and a tragal cartilage graft. No obvious facial nerve dehiscence or abnormal activity throughout the case.      Indications:   Nikolay Fowler is a 39 year old male with a history of long standing right eustachian tube dysfunction, epitympanic retraction pocket with cholesteatoma, and conductive hearing loss.  Risks and benefits of the above procedure were had with the patient and informed consent was obtained in the clinic. This was confirmed in the preoperative area.     Procedure:  The patient was brought into the operating room and placed supine on the operating room table.  A brief time out had been performed already.  General anesthesia was induced and endotracheal intubation was performed.  The patient was turned 180 degrees.  The area behind the right ear was shaved and marked. 1% lidocaine with 1:100,000 epinephrine was injected into the planned incisions.  Facial nerve monitor electrodes were placed on the right  face and connected to the nerve monitor.  Impedances were checked and a tap test was performed.  The monitor was used for the entirety of the case.  The patient was then prepped and draped in standard surgical fashion.  Time Out was performed with identification of the patient, side of the procedure and procedures to be done.       The ear canal was cleaned and the tympanic membrane was inspected with findings noted above. The ear canal was injected with lidocaine 1% with 1:100,000 epinephrine. Postauricular incision was made down to the temporalis fascia and a true temporalis fascia graft was obtained and prepared for grafting purposes.  The mastoid periosteum was incised and the cortex exposed to the spine of Henle. The vascular strip was then elevated, incised and reflected forward with the pinna. The retraction pocket was further inspected and there was squamous debris embedded and obvious cholesteatoma sac extending into the posterior mesotympanum.     The tympanomeatal flap was elevated anteriorly preserving the annulus and chorda tympani nerve. The flap was  from the retraction pocket and the native healthy tympanic membrane was elevated anteriorly off of the neck of the malleus. This exposed a well defined, well encapsulated portion of the cholesteatoma which was removed from the posterior mesotympanum and the attachments to the retraction pocket were divided. There was no matrix involvement of the ossicular chain and the chain was noted to be intact though the long process of the incus had been eroded and there was only a residual fibrous band between the long process and the stapes capitulum. The retraction pocket was again inspected and noted to be tracking deep into the epitympanum. An atticotomy was performed in order to completely visualize the extent of the pocket. Once the atticotomy was complete, the full extent of the retraction pocket was visualized and the matrix of the cholesteatoma was  completely excised without visible violations of the lining. The middle ear was irrigated and no further squamous elements were visualized.     The TM and atticotomy defects were carefully examined and the decision was made to elevate the native TM off of the remainder of the malleus to facilitate reconstruction. Of note, the native TM was noted to be very atrophic. Epinephrine soaked gelfoam was placed in the middle ear to obtain hemostasis. Next, attention turned to the tragus to harvest a cartilage graft. The posterior aspect of the tragal skin was incised and the tragus was identified. The posterior aspect of the tragus was dissected while preserving the perichondrium. An incision was made through the tragus while preserving the dome of the cartilage. The anterior aspect of the tragus was dissected, again while preserving the perichondrium. The tragal graft was then excised. The defect was inspected for hemostasis and then the incision was closed with interrupted 5-0 fast gut suture.     The epinephrine soaked gelfoam was completely removed and then Ciprodex soaked Gelfoam was placed within the eustachian tube orifice and anterior mesotympanum.  The fascia graft was trimmed with a slit for the malleus neck.  It was placed medial to the malleus and then reflected posteriorly around it after snugging the notch around the neck.  The anterior mesotympanum was fully packed with Ciprodex soaked Gelfoam's and the graft was reflected anteriorly and tucked circumferentially medial to the residual annulus and tympanic membrane. The graft was then elevated posteriorly and the posterior mesotympanum was packed with Ciprodex soaked Gelfoam. The tragal cartilage had been crafted into two pieces to reconstruct both the attic defect and posterior mesotympanum and these grafts were place. The fascia graft was replaced draped up the ear canal wall. The tympanic membrane was packed with ciprodex soaked gelfoam laterally and  additional fascia graft was placed in the lateral bony EAC to support the vascular strip. The mastoid periosteum was tacked with 3-0 Vicryl sutures and the vascular strip was unfurled in the ear canal and secured with Ciprodex soaked Gelfoam.  The mastoid periosteum was then closed with interrupted 3-0 Vicryl sutures, followed by a multilayer closure of the subcutaneous wound.  The vascular strip was positioned in the EAC and additional ciprodex soaked gelfoam was packed in the external auditory canal. The skin was covered with Steri-Strips adhered with Mastisol.  A Katie dressing was placed and the procedure was complete.  Patient was turned back over to anesthesia in stable condition.      Dolly Henriquez MD  Fellow Physician  Otology & Neurotology  [facility]      I, Vidhi Garcia MD, was present during the entire procedure between opening and closing.

## 2024-09-04 NOTE — ANESTHESIA PROCEDURE NOTES
Airway       Patient location during procedure: OR       Procedure Start/Stop Times: 9/4/2024 7:25 AM  Staff -        Performed By: CRNAIndications and Patient Condition       Indications for airway management: siomara-procedural       Induction type:intravenous       Mask difficulty assessment: 1 - vent by mask    Final Airway Details       Final airway type: endotracheal airway       Successful airway: ETT - single and Oral  Endotracheal Airway Details        ETT size (mm): 7.5       Cuffed: yes       Successful intubation technique: direct laryngoscopy       DL Blade Type: MAC 4       Grade View of Cords: 1       Adjucts: stylet       Position: Left       Measured from: lips       Secured at (cm): 22       Bite block used: None    Post intubation assessment        Placement verified by: capnometry and equal breath sounds        Number of attempts at approach: 1       Number of other approaches attempted: 0       Secured with: tape       Ease of procedure: easy       Dentition: Intact and Unchanged    Medication(s) Administered   Medication Administration Time: 9/4/2024 7:25 AM

## 2024-09-04 NOTE — BRIEF OP NOTE
[facility] [place] Brief Operative Note    Pre-operative diagnosis: Cholesteatoma, right [H71.91]   Post-operative diagnosis As above   Procedure: Procedure(s):  RIGHT ATTICOTOMY AND TYMPANOPLASTY WITH CARTILAGE GRAFT, Removal of cholesteatoma   Surgeon(s): Surgeons and Role:     * Vidhi Garcia MD - Primary     * Jeanette Saavedra MD - Resident - Assisting     * Dolly Henriquez MD - Fellow - Assisting   Estimated blood loss: 15 mL   Specimens: ID Type Source Tests Collected by Time Destination   1 : Right middle ear contents Tissue Middle Ear Contents, Right SURGICAL PATHOLOGY EXAM Vidhi Garcia MD 9/4/2024  9:09 AM       Findings: Right epitympanic retraction pocket with well defined cholesteatoma involving the posterior mesotympanum. The ossicular chain was intact and mobile though the long process of the incus was eroded and the incudostapedial joint was solely comprised of a fibrous union. The retraction pocket was excised and the remaining native TM was noted to be very atrophic. Reconstruction was performed with temporalis fascia and a tragal cartilage graft.

## 2024-09-05 LAB
PATH REPORT.COMMENTS IMP SPEC: NORMAL
PATH REPORT.COMMENTS IMP SPEC: NORMAL
PATH REPORT.FINAL DX SPEC: NORMAL
PATH REPORT.GROSS SPEC: NORMAL
PATH REPORT.MICROSCOPIC SPEC OTHER STN: NORMAL
PATH REPORT.RELEVANT HX SPEC: NORMAL
PHOTO IMAGE: NORMAL

## 2024-09-09 ENCOUNTER — TELEPHONE (OUTPATIENT)
Dept: OTOLARYNGOLOGY | Facility: CLINIC | Age: 39
End: 2024-09-09
Payer: COMMERCIAL

## 2024-09-09 NOTE — TELEPHONE ENCOUNTER
Spoke with wife, Tatianna, she is becoming increasingly concerned about patient's shoulder pain that was present at the time of discharge, but has continuously worsened. Pt woke up today and could barely get out of bed. Writer asked if the pain was shooting down his arm or if there was any numbness or tingling in his fingers, pt's wife was unsure. She did note, she can't touch it without him wincing in pain. He has been alternating tylenol and ibuprofen and does not want to use his prescribed Norco. In addition to his pain, pt is easily fatigued and requires multiple naps throughout the day. Wife mentioned going for a drive, with him as passenger, tired him out completely. He has also felt slightly off balance, no trips or falls, and has not had to catch himself, just the sensation of being off balance. Pt's wife is also concerned about pt returning to work in his current status. Writer assured her we do not want patient to return to work either until symptoms are better under control or alleviated. A note can be provided to patient's employer.     Writer asked about the surgical site and any concerns the patient has outside of the shoulder pain, fatigue, and sensation of being off balance, pt's wife stated he hasn't mentioned anything else. Site looks good, no signs of infection.    Writer to route message to Dr. Garcia and reach back out to pt or pt's wife with recommendations and next steps.

## 2024-09-09 NOTE — TELEPHONE ENCOUNTER
[facility] Call Center    Phone Message    May a detailed message be left on voicemail: yes     Reason for Call: Other: Pt wife Danielle called, said pt has had severe shoulder pain since surgery and extreme fatigue.  Would like to discuss with Dr Garcia team. Would also like to discuss pt return to work.  Please call Danielle @ [phone].  [facility] location, thanks     Action Taken: Other: ENT    Travel Screening: Not Applicable     Date of Service:

## 2024-09-09 NOTE — LETTER
September 10, 2024      Nikolay Fowler  [address] [address]        To Whom It May Concern:    Nikolay Fowler had surgery on 09/04/2024. He may return to work on 09/12/2024 with the following: limited to light duty - lifting no greater than 10 pounds, no strenuous activity, and driving only if he is no longer experiencing dizziness and/or fatigue. Restrictions in place until cleared by provider. First post operative appointment is scheduled for 09/24/2024.     Sincerely,        Dr. Vidhi Garcia

## 2024-09-09 NOTE — TELEPHONE ENCOUNTER
[facility] Call Center    Phone Message    May a detailed message be left on voicemail: yes     Reason for Call: Other: Pt's spouse called back to coordinate being evaluated today. Please call spouse back     Action Taken: Other: [facility] ENT    Travel Screening: Not Applicable     Date of Service:

## 2024-09-18 ENCOUNTER — TELEPHONE (OUTPATIENT)
Dept: OTOLARYNGOLOGY | Facility: CLINIC | Age: 39
End: 2024-09-18
Payer: COMMERCIAL

## 2024-09-18 NOTE — TELEPHONE ENCOUNTER
[facility] Call Center    Phone Message    May a detailed message be left on voicemail: yes     Reason for Call: Other: Pt recently had surgery and is wondering if he can take the cotton out of his ear, please call to discuss, thanks     Action Taken: Other: ENT    Travel Screening: Not Applicable     Date of Service:

## 2024-09-19 NOTE — TELEPHONE ENCOUNTER
[facility] Call Center    Phone Message    May a detailed message be left on voicemail: yes     Reason for Call: Other: Pt calling back in regards to the packing in his inner ear. States he took the cotton out but feels like the packing is going into his ear canal and would like to know if he can leave it.  Please advise. Thanks.    Action Taken: Other: ENT    Travel Screening: Not Applicable     Date of Service:

## 2024-09-20 NOTE — TELEPHONE ENCOUNTER
Let pt know to keep packing in the ear. Reminder to start drops as directed. No further questions.    Anastasia Peter LPN

## 2024-09-21 NOTE — PROGRESS NOTES
"  Neurotology Clinic      Name: Nikolay Fowler  MRN: [id number]  Age: 39 year old  : 2024      Chief Complaint:   Follow up     History of Present Illness:   Nikolay Fowler is a 39 year old male presenting for follow up after a Right postauricular tympanoplasty with atticotomy and canalplasty performed on 2024. He presents today with continued muffled hearing as expected for a first post op visit. He has no drainage and is overall doing well.     2024 Procedures:  Right postauricular tympanoplasty with atticotomy and canalplasty   Milfay of tragal cartilage graft   Facial nerve monitoring     Findings: Right epitympanic retraction pocket with well defined cholesteatoma involving the posterior mesotympanum. The ossicular chain was intact and mobile though the long process of the incus was eroded and the incudostapedial joint was solely comprised of a fibrous union. The retraction pocket was excised and the remaining native TM was noted to be very atrophic. Reconstruction was performed with temporalis fascia and a tragal cartilage graft. No obvious facial nerve dehiscence or abnormal activity throughout the case.      Review of Systems:   Pertinent items are noted in HPI or as in patient entered ROS below, remainder of complete ROS is negative.       2024    10:11 AM    ENT ROS   Ears, Nose, Throat Hearing loss    Ringing/noise in ears         Physical Exam:   BP (!) 159/75   Pulse 71   Temp 98  F (36.7  C)   Ht 1.753 m (5' 9\")   Wt 82.1 kg (181 lb)   SpO2 98%   BMI 26.73 kg/m       Constitutional:  The patient was unaccompanied, well-groomed, and in no acute distress.     Skin: Normal:  warm and pink without rash   Neurologic: Alert and oriented x 3.  CN's III-XII within normal limits.  Voice normal.    Psychiatric: The patient's affect was calm, cooperative, and appropriate.     Communication:  Normal; communicates verbally, normal voice quality.   Respiratory: " Breathing comfortably without stridor or exertion of accessory muscles.    Head/Face:  No lesions or scars. No sinus tenderness.     Eyes: Pupils were equal and reactive.  Extraocular movement intact.     Ears: Pinnae and tragus non-tender.       Otologic microscope exam:  Left ear: Postoperative appearance as expected. The graft is in good position, and he has not epithelized a small area in the posterior ear canal, having a region of bone exposure that remains to continue healing.     Assessment and Plan:  Nikolay Fowler is a 39 year old male presenting for follow up after a Right postauricular tympanoplasty with atticotomy and canalplasty performed on 09/04/2024.  We discussed the findings of the procedure and what can be expected in the future with an expected revision surgery. Physical examination showed that he is healing well and in the right direction. He has been instructed instill 4-drops each day for one week and then for a few days prior to his next visit. The symptoms that he describes can be expected after surgery, and can be expected to improve with time. He has been encouraged to follow dry ear precautions and use discretion when around water. We discussed using a cotton-ball and Vaseline to prevent water getting into the ear while showering. He has been advised to refrain from heavy lifting. He has been encouraged to reach out if symptoms worsen or if any new concerns arise before his next visit.      Follow-up: 1-months     Scribe Disclosure:   I, Brittney Norton, am serving as a scribe; to document services personally performed by Vidhi Garcia MD -based on data collection and the provider's statements to me.     Provider Disclosure:  I agree with above History, Review of Systems, Physical exam and Plan.  I have reviewed the content of the documentation and have edited it as needed. I have personally performed the services documented here and the documentation accurately represents those  services and the decisions I have made.      Electronically signed by:    Vidhi Garcia MD  Otology & Neurotology  [facility]

## 2024-09-24 ENCOUNTER — OFFICE VISIT (OUTPATIENT)
Dept: OTOLARYNGOLOGY | Facility: CLINIC | Age: 39
End: 2024-09-24
Payer: COMMERCIAL

## 2024-09-24 VITALS
BODY MASS INDEX: 26.81 KG/M2 | DIASTOLIC BLOOD PRESSURE: 75 MMHG | HEART RATE: 71 BPM | HEIGHT: 69 IN | WEIGHT: 181 LBS | OXYGEN SATURATION: 98 % | SYSTOLIC BLOOD PRESSURE: 159 MMHG | TEMPERATURE: 98 F

## 2024-09-24 DIAGNOSIS — H71.91 CHOLESTEATOMA OF RIGHT EAR: Primary | ICD-10-CM

## 2024-09-24 DIAGNOSIS — H90.11 CONDUCTIVE HEARING LOSS OF RIGHT EAR, UNSPECIFIED HEARING STATUS ON CONTRALATERAL SIDE: ICD-10-CM

## 2024-09-24 PROCEDURE — 99024 POSTOP FOLLOW-UP VISIT: CPT | Performed by: OTOLARYNGOLOGY

## 2024-09-24 RX ORDER — IBUPROFEN 200 MG
200 TABLET ORAL EVERY 4 HOURS PRN
COMMUNITY

## 2024-09-24 ASSESSMENT — PAIN SCALES - GENERAL: PAINLEVEL: NO PAIN (0)

## 2024-09-24 NOTE — LETTER
"2024       RE: Nikolay Fowler  [address] [address]     Dear Colleague,    Thank you for referring your patient, Nikolay Fowler, to the [facility] EAR NOSE AND THROAT CLINIC [place] at [facility]. Please see a copy of my visit note below.      Neurotology Clinic      Name: Nikolay Fowler  MRN: [id number]  Age: 39 year old  : 2024      Chief Complaint:   Follow up     History of Present Illness:   Nikolay Fowler is a 39 year old male presenting for follow up after a Right postauricular tympanoplasty with atticotomy and canalplasty performed on 2024. He presents today with continued muffled hearing as expected for a first post op visit. He has no drainage and is overall doing well.     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 The ossicular chain was intact and mobile though the long process of the incus was eroded and the incudostapedial joint was solely comprised of a fibrous union. The retraction pocket was excised and the remaining native TM was noted to be very atrophic. Reconstruction was performed with temporalis fascia and a tragal cartilage graft. No obvious facial nerve dehiscence or abnormal activity throughout the case.      Review of Systems:   Pertinent items are noted in HPI or as in patient entered ROS below, remainder of complete ROS is negative.       2024    10:11 AM    ENT ROS   Ears, Nose, Throat Hearing loss    Ringing/noise in ears         Physical Exam:   BP (!) 159/75   Pulse 71   Temp 98  F (36.7  C)   Ht 1.753 m (5' 9\")   Wt 82.1 kg (181 lb)   SpO2 98%   BMI 26.73 kg/m       Constitutional:  The patient was unaccompanied, well-groomed, and " in no acute distress.     Skin: Normal:  warm and pink without rash   Neurologic: Alert and oriented x 3.  CN's III-XII within normal limits.  Voice normal.    Psychiatric: The patient's affect was calm, cooperative, and appropriate.     Communication:  Normal; communicates verbally, normal voice quality.   Respiratory: Breathing comfortably without stridor or exertion of accessory muscles.    Head/Face:  No lesions or scars. No sinus tenderness.     Eyes: Pupils were equal and reactive.  Extraocular movement intact.     Ears: Pinnae and tragus non-tender.       Otologic microscope exam:  Left ear: Postoperative appearance as expected. The graft is in good position, and he has not epithelized a small area in the posterior ear canal, having a region of bone exposure that remains to continue healing.     Assessment and Plan:  Nikolay Fowler is a 39 year old male presenting for follow up after a Right postauricular tympanoplasty with atticotomy and canalplasty performed on 09/04/2024.  We discussed the findings of the procedure and what can be expected in the future with an expected revision surgery. Physical examination showed that he is healing well and in the right direction. He has been instructed instill 4-drops each day for one week and then for a few days prior to his next visit. The symptoms that he describes can be expected after surgery, and can be expected to improve with time. He has been encouraged to follow dry ear precautions and use discretion when around water. We discussed using a cotton-ball and Vaseline to prevent water getting into the ear while showering. He has been advised to refrain from heavy lifting. He has been encouraged to reach out if symptoms worsen or if any new concerns arise before his next visit.      Follow-up: 1-months     Scribe Disclosure:   Brittney ARSHAD, am serving as a scribe; to document services personally performed by Vidhi Garcia MD -based on data collection  and the provider's statements to me.     Provider Disclosure:  I agree with above History, Review of Systems, Physical exam and Plan.  I have reviewed the content of the documentation and have edited it as needed. I have personally performed the services documented here and the documentation accurately represents those services and the decisions I have made.      Electronically signed by:    Vidhi Garcia MD  Otology & Neurotology  [facility]        Again, thank you for allowing me to participate in the care of your patient.      Sincerely,    Vidhi Garcia MD

## 2024-09-24 NOTE — PATIENT INSTRUCTIONS
You were seen in the ENT Clinic today by Dr. Garcia. If you have any questions or concerns after your appointment, please contact us (see below)      2.   Please return to clinic in about one month.         How to Contact Us:  Send a [employer] message to your provider. Our team will respond to you via [employer]. Occasionally, we will need to call you to get further information.  For urgent matters (Monday-Friday), call the ENT Clinic: [phone] and speak with a call center team member - they will route your call appropriately.   If you'd like to speak directly with a nurse, please find our contact information below. We do our best to check voicemail frequently throughout the day, and will work to call you back within 1-2 days. For urgent matters, please use the general clinic phone numbers listed above.      Karla DICKENS RN  ENT RN Care Coordinator  Direct: [phone]    Anastasia HAMILTON LPN  Direct: [phone]

## 2024-09-24 NOTE — LETTER
September 24, 2024      Nikolay Fowler  [address] [address]        To Whom It May Concern:     Nikolay Fowler had surgery on 09/04/2024. He may continue to work with the following: limited to light duty - lifting no greater than 10 pounds and no strenuous activity. Restrictions in place until cleared by provider. Next post operative appointment is scheduled for 10/18/2024.      Sincerely,           Dr. Vidhi Garcia

## 2024-09-24 NOTE — NURSING NOTE
"Chief Complaint   Patient presents with    RECHECK     3 week post op     Blood pressure (!) 159/75, pulse 71, temperature 98  F (36.7  C), height 1.753 m (5' 9\"), weight 82.1 kg (181 lb), SpO2 98%.  Christian Camargo LPN    "

## 2024-10-14 NOTE — PROGRESS NOTES
"  Neurotology Clinic      Name: Nikolay Fowler  MRN: [id number]  Age: 39 year old  : 1985  10/18/2024      Chief Complaint:   Follow up     History of Present Illness:   Nikoaly Fowler is a 39 year old male presenting for follow up after a Right postauricular tympanoplasty with atticotomy and canalplasty performed on 2024. He was previously seen on 2024, where he was healing appropriately. He says that he is doing well, with some drainage and fluctuations. He describes it as \"super crunchy.\"      Review of Systems:   Pertinent items are noted in HPI or as in patient entered ROS below, remainder of complete ROS is negative.       10/18/2024    11:50 AM    ENT ROS   Ears, Nose, Throat Hearing loss    Ear pain    Ringing/noise in ears         Physical Exam:   BP (!) 151/89   Pulse 67   Temp 97.5  F (36.4  C)   Ht 1.753 m (5' 9\")   Wt 83 kg (183 lb)   SpO2 97%   BMI 27.02 kg/m       Constitutional:  The patient was accompanied, well-groomed, and in no acute distress.     Skin: Normal:  warm and pink without rash   Neurologic: Alert and oriented x 3.  CN's III-XII within normal limits.  Voice normal.    Psychiatric: The patient's affect was calm, cooperative, and appropriate.     Communication:  Normal; communicates verbally, normal voice quality.   Respiratory: Breathing comfortably without stridor or exertion of accessory muscles.    Head/Face:  No lesions or scars. No sinus tenderness.     Eyes: Pupils were equal and reactive.  Extraocular movement intact.     Ears: Pinnae and tragus non-tender.        Otologic microscope exam:  Left ear:  Postoperative appearance as expected. The graft is in good position, and there is an area that still needs to be epithelialized along the roof of the cavity that is shiny in appearance. There is a section of the mid-posterior canal where the bone is still exposed and needs to continue to heal. The tympanic membrane itself is healed and thickened. "      Assessment and Plan:  Nikolay Fowler is a 39 year old male presenting for follow up after a Right postauricular tympanoplasty with atticotomy and canalplasty performed on 09/04/2024. Physical examination showed his left ear still has an david of exposed bone that needs time to continue healing. He experienced some pain during the examination, but was able to tolerate it without additional complication. Many of the symptoms that he describes can be expected after surgery, and can be expected to improve with time. He has been encouraged to follow dry ear precautions and use discretion when around water. Activity restrictions have been lifted. We discussed postoperative care, activity restrictions, and recovery methods. He has been prescribed ofloxacin (FLOXIN) 0.3 % otic solution and has been instructed to place 4 drops into the right ear at bedtime for 21 days. We will provide him a note for his work. He has been encouraged to reach out if symptoms worsen or if any new concerns arise before his next visit.      Follow-up: 6-8 weeks      Scribe Disclosure:   I, Brittney Norton, am serving as a scribe; to document services personally performed by Vidhi Garcia MD -based on data collection and the provider's statements to me.     Provider Disclosure:  I agree with above History, Review of Systems, Physical exam and Plan.  I have reviewed the content of the documentation and have edited it as needed. I have personally performed the services documented here and the documentation accurately represents those services and the decisions I have made.      Electronically signed by:    Vidhi Garcia MD  Otology & Neurotology  [facility]

## 2024-10-18 ENCOUNTER — OFFICE VISIT (OUTPATIENT)
Dept: OTOLARYNGOLOGY | Facility: CLINIC | Age: 39
End: 2024-10-18
Payer: COMMERCIAL

## 2024-10-18 ENCOUNTER — TELEPHONE (OUTPATIENT)
Dept: OTOLARYNGOLOGY | Facility: CLINIC | Age: 39
End: 2024-10-18

## 2024-10-18 VITALS
BODY MASS INDEX: 27.11 KG/M2 | HEART RATE: 67 BPM | SYSTOLIC BLOOD PRESSURE: 151 MMHG | DIASTOLIC BLOOD PRESSURE: 89 MMHG | WEIGHT: 183 LBS | OXYGEN SATURATION: 97 % | HEIGHT: 69 IN | TEMPERATURE: 97.5 F

## 2024-10-18 DIAGNOSIS — H71.91 CHOLESTEATOMA OF RIGHT EAR: Primary | ICD-10-CM

## 2024-10-18 PROCEDURE — 99024 POSTOP FOLLOW-UP VISIT: CPT | Performed by: OTOLARYNGOLOGY

## 2024-10-18 RX ORDER — OFLOXACIN 3 MG/ML
4 SOLUTION AURICULAR (OTIC) AT BEDTIME
Qty: 10 ML | Refills: 1 | Status: SHIPPED | OUTPATIENT
Start: 2024-10-18 | End: 2024-11-08

## 2024-10-18 ASSESSMENT — PAIN SCALES - GENERAL: PAINLEVEL: NO PAIN (0)

## 2024-10-18 NOTE — NURSING NOTE
"Chief Complaint   Patient presents with    RECHECK     Follow up     Blood pressure (!) 151/89, pulse 67, temperature 97.5  F (36.4  C), height 1.753 m (5' 9\"), weight 83 kg (183 lb), SpO2 97%.  Christian Camargo LPN    "

## 2024-10-18 NOTE — LETTER
"10/18/2024       RE: Nikolay Fowler  [address] [address]     Dear Colleague,    Thank you for referring your patient, Nikolay Fowler, to the [facility] EAR NOSE AND THROAT CLINIC [place] at [facility]. Please see a copy of my visit note below.      Neurotology Clinic      Name: Nikolay Fowler  MRN: [id number]  Age: 39 year old  : 1985  10/18/2024      Chief Complaint:   Follow up     History of Present Illness:   Nikolay Fowler is a 39 year old male presenting for follow up after a Right postauricular tympanoplasty with atticotomy and canalplasty performed on 2024. He was previously seen on 2024, where he was healing appropriately. He says that he is doing well, with some drainage and fluctuations. He describes it as \"super crunchy.\"      Review of Systems:   Pertinent items are noted in HPI or as in patient entered ROS below, remainder of complete ROS is negative.       10/18/2024    11:50 AM    ENT ROS   Ears, Nose, Throat Hearing loss    Ear pain    Ringing/noise in ears         Physical Exam:   BP (!) 151/89   Pulse 67   Temp 97.5  F (36.4  C)   Ht 1.753 m (5' 9\")   Wt 83 kg (183 lb)   SpO2 97%   BMI 27.02 kg/m       Constitutional:  The patient was accompanied, well-groomed, and in no acute distress.     Skin: Normal:  warm and pink without rash   Neurologic: Alert and oriented x 3.  CN's III-XII within normal limits.  Voice normal.    Psychiatric: The patient's affect was calm, cooperative, and appropriate.     Communication:  Normal; communicates verbally, normal voice quality.   Respiratory: Breathing comfortably without stridor or exertion of accessory muscles.    Head/Face:  No lesions or scars. No sinus tenderness.     Eyes: Pupils were equal and reactive.  Extraocular movement intact.     Ears: Pinnae and tragus non-tender.        Otologic microscope exam:  Left ear:  Postoperative appearance " as expected. The graft is in good position, and there is an area that still needs to be epithelialized along the roof of the cavity that is shiny in appearance. There is a section of the mid-posterior canal where the bone is still exposed and needs to continue to heal. The tympanic membrane itself is healed and thickened.      Assessment and Plan:  Nikolay Fowler is a 39 year old male presenting for follow up after a Right postauricular tympanoplasty with atticotomy and canalplasty performed on 09/04/2024. Physical examination showed his left ear still has an david of exposed bone that needs time to continue healing. He experienced some pain during the examination, but was able to tolerate it without additional complication. Many of the symptoms that he describes can be expected after surgery, and can be expected to improve with time. He has been encouraged to follow dry ear precautions and use discretion when around water. Activity restrictions have been lifted. We discussed postoperative care, activity restrictions, and recovery methods. He has been prescribed ofloxacin (FLOXIN) 0.3 % otic solution and has been instructed to place 4 drops into the right ear at bedtime for 21 days. We will provide him a note for his work. He has been encouraged to reach out if symptoms worsen or if any new concerns arise before his next visit.      Follow-up: 6-8 weeks      Scribe Disclosure:   I, Brittney Norton, am serving as a scribe; to document services personally performed by Vidhi Garcia MD -based on data collection and the provider's statements to me.     Provider Disclosure:  I agree with above History, Review of Systems, Physical exam and Plan.  I have reviewed the content of the documentation and have edited it as needed. I have personally performed the services documented here and the documentation accurately represents those services and the decisions I have made.      Electronically signed by:    Vidhi GOMEZ  MD Jose  Otology & Neurotology  [facility]          Again, thank you for allowing me to participate in the care of your patient.      Sincerely,    Vidhi Garcia MD

## 2024-10-18 NOTE — PATIENT INSTRUCTIONS
You were seen in the ENT Clinic today by Dr. Garcia. If you have any questions or concerns after your appointment, please contact us (see below)      2.   Please return to clinic in 6-8 weeks.         How to Contact Us:  Send a [employer] message to your provider. Our team will respond to you via [employer]. Occasionally, we will need to call you to get further information.  For urgent matters (Monday-Friday), call the ENT Clinic: [phone] and speak with a call center team member - they will route your call appropriately.   If you'd like to speak directly with a nurse, please find our contact information below. We do our best to check voicemail frequently throughout the day, and will work to call you back within 1-2 days. For urgent matters, please use the general clinic phone numbers listed above.      Karla DICKENS RN  ENT RN Care Coordinator  Direct: [phone]    Anastasia HAMILTON LPN  Direct: [phone]

## 2024-10-18 NOTE — TELEPHONE ENCOUNTER
Left Voicemail (1st Attempt) for the patient to call back and schedule the following:    Appointment Type: Return Ear  Provider: Dr. Vidhi Garcia   Appt date: 12/6 @ 7540   Specialty phone number: direct  Additional appointment(s) needed:   Additional Notes:

## 2024-10-28 ENCOUNTER — TELEPHONE (OUTPATIENT)
Dept: OTOLARYNGOLOGY | Facility: CLINIC | Age: 39
End: 2024-10-28
Payer: COMMERCIAL

## 2024-10-28 NOTE — TELEPHONE ENCOUNTER
[facility] Call Center    Phone Message    May a detailed message be left on voicemail: yes     Reason for Call: Other: Pt wife called, they need a release of the 10lb weight restriction and confirmation of the 10/18/24.  Pt wife would like Wily to email her the information.   [facility] [place], thanks     Action Taken: Other: ENT    Travel Screening: Not Applicable     Date of Service:

## (undated) DEVICE — TONGUE DEPRESSOR STERILE 25-705-ALL

## (undated) DEVICE — SU VICRYL 3-0 RB-1 27" UND J215H

## (undated) DEVICE — DRSG TEGADERM 2 3/8X2 3/4" 1624W

## (undated) DEVICE — PACK EAR CUSTOM ASC

## (undated) DEVICE — TUBING SUCTION MEDI-VAC 1/4"X20' N620A

## (undated) DEVICE — SYR 01ML 27GA 0.5" NDL TBC 309623

## (undated) DEVICE — STRAP KNEE/BODY 31143004

## (undated) DEVICE — INSTRUMENT WIPE VISIWIPE 581047

## (undated) DEVICE — NIM ELEC SUBDERMAL NDL 3PAIR/BOX

## (undated) DEVICE — LINEN GOWN XLG 5407

## (undated) DEVICE — PREP POVIDONE IODINE SOLUTION 10% 4OZ BOTTLE 29906-004

## (undated) DEVICE — SPONGE SURGIFOAM 100 1974

## (undated) DEVICE — PREP PAD ALCOHOL 6818

## (undated) DEVICE — DRAPE MICROSCOPE LEICA 54X150" AR8033650

## (undated) DEVICE — ESU GROUND PAD ADULT W/CORD E7507

## (undated) DEVICE — SUCTION SYSTEM LEVINE IRRIGATION SP-3000

## (undated) DEVICE — BONE WAX 2.5GM W31G

## (undated) DEVICE — BLADE KNIFE BEAVER MINI BEAVER6400

## (undated) DEVICE — TRAY PREP DRY SKIN SCRUB 067

## (undated) DEVICE — LINEN TOWEL PACK X5 5464

## (undated) DEVICE — ADH LIQUID MASTISOL TOPICAL VIAL 2-3ML 0523-48

## (undated) DEVICE — DRAPE STOCKINETTE IMPERVIOUS 10" 21048

## (undated) DEVICE — SOL NACL 0.9% IRRIG 3000ML BAG 2B7477

## (undated) DEVICE — DRAPE WARMER 66X44" ORS-300

## (undated) DEVICE — SUCTION MANIFOLD NEPTUNE 2 SYS 4 PORT 0702-020-000

## (undated) DEVICE — DRAPE STERI TOWEL SM 1000

## (undated) DEVICE — ESU ELEC BLADE 2.75" COATED/INSULATED E1455

## (undated) DEVICE — GLOVE LINER FINGER 1/2

## (undated) DEVICE — DRSG GLASSCOCK ADULT S-1000

## (undated) DEVICE — SOL WATER IRRIG 500ML BOTTLE 2F7113

## (undated) DEVICE — SU PLAIN FAST ABSORB 5-0 PC-1 18" 1915G

## (undated) DEVICE — GLOVE GAMMEX NEOPRENE ULTRA SZ 7 LF 8514

## (undated) DEVICE — SOL NACL 0.9% IRRIG 500ML BOTTLE 2F7123

## (undated) DEVICE — DRAPE POUCH IRR 1016

## (undated) DEVICE — NDL 27GA 1.25" 305136

## (undated) DEVICE — PREP POVIDONE-IODINE 7.5% SCRUB 4OZ BOTTLE MDS093945

## (undated) RX ORDER — PROPOFOL 10 MG/ML
INJECTION, EMULSION INTRAVENOUS
Status: DISPENSED
Start: 2024-09-04

## (undated) RX ORDER — REMIFENTANIL HYDROCHLORIDE 1 MG/ML
INJECTION, POWDER, LYOPHILIZED, FOR SOLUTION INTRAVENOUS
Status: DISPENSED
Start: 2024-09-04

## (undated) RX ORDER — PHENYLEPHRINE HYDROCHLORIDE 10 MG/ML
INJECTION INTRAVENOUS
Status: DISPENSED
Start: 2024-09-04

## (undated) RX ORDER — FENTANYL CITRATE-0.9 % NACL/PF 10 MCG/ML
PLASTIC BAG, INJECTION (ML) INTRAVENOUS
Status: DISPENSED
Start: 2024-09-04

## (undated) RX ORDER — ACETAMINOPHEN 325 MG/1
TABLET ORAL
Status: DISPENSED
Start: 2024-09-04

## (undated) RX ORDER — CIPROFLOXACIN AND DEXAMETHASONE 3; 1 MG/ML; MG/ML
SUSPENSION/ DROPS AURICULAR (OTIC)
Status: DISPENSED
Start: 2024-09-04

## (undated) RX ORDER — HYDROMORPHONE HYDROCHLORIDE 1 MG/ML
INJECTION, SOLUTION INTRAMUSCULAR; INTRAVENOUS; SUBCUTANEOUS
Status: DISPENSED
Start: 2024-09-04

## (undated) RX ORDER — EPHEDRINE SULFATE 50 MG/ML
INJECTION, SOLUTION INTRAMUSCULAR; INTRAVENOUS; SUBCUTANEOUS
Status: DISPENSED
Start: 2024-09-04

## (undated) RX ORDER — IPRATROPIUM BROMIDE AND ALBUTEROL SULFATE 2.5; .5 MG/3ML; MG/3ML
SOLUTION RESPIRATORY (INHALATION)
Status: DISPENSED
Start: 2024-09-04

## (undated) RX ORDER — LIDOCAINE HYDROCHLORIDE AND EPINEPHRINE 10; 10 MG/ML; UG/ML
INJECTION, SOLUTION INFILTRATION; PERINEURAL
Status: DISPENSED
Start: 2024-09-04

## (undated) RX ORDER — KETOROLAC TROMETHAMINE 30 MG/ML
INJECTION, SOLUTION INTRAMUSCULAR; INTRAVENOUS
Status: DISPENSED
Start: 2024-09-04

## (undated) RX ORDER — CEFUROXIME SODIUM 1.5 G/16ML
INJECTION, POWDER, FOR SOLUTION INTRAVENOUS
Status: DISPENSED
Start: 2024-09-04

## (undated) RX ORDER — ONDANSETRON 2 MG/ML
INJECTION INTRAMUSCULAR; INTRAVENOUS
Status: DISPENSED
Start: 2024-09-04

## (undated) RX ORDER — EPINEPHRINE NASAL SOLUTION 1 MG/ML
SOLUTION NASAL
Status: DISPENSED
Start: 2024-09-04

## (undated) RX ORDER — FENTANYL CITRATE 50 UG/ML
INJECTION, SOLUTION INTRAMUSCULAR; INTRAVENOUS
Status: DISPENSED
Start: 2024-09-04

## (undated) RX ORDER — VASOPRESSIN 20 U/ML
INJECTION PARENTERAL
Status: DISPENSED
Start: 2024-09-04

## (undated) RX ORDER — LIDOCAINE HYDROCHLORIDE 40 MG/ML
SOLUTION TOPICAL
Status: DISPENSED
Start: 2024-09-04

## (undated) RX ORDER — DEXAMETHASONE SODIUM PHOSPHATE 10 MG/ML
INJECTION, SOLUTION INTRAMUSCULAR; INTRAVENOUS
Status: DISPENSED
Start: 2024-09-04